# Patient Record
(demographics unavailable — no encounter records)

---

## 2025-05-06 NOTE — PLAN
[FreeTextEntry1] : 61-year-old  5 para 3 presents with a history of postmenopausal bleeding in April. Reviewed differential diagnosis of postmenopausal bleeding.  Plan: Transvaginal pelvic ultrasound.  Possible dilatation and curettage with diagnostic hysteroscopy pending the results of the pelvic ultrasound.  Information pamphlets were given describing the operative procedure.

## 2025-05-06 NOTE — REVIEW OF SYSTEMS
[Patient Intake Form Reviewed] : Patient intake form was reviewed [FreeTextEntry7] : Postmenopausal bleeding [FreeTextEntry8] : Postmenopausal bleeding

## 2025-05-06 NOTE — PHYSICAL EXAM
[Oriented x3] : oriented x3 [Labia Majora] : normal [Labia Minora] : normal [No Bleeding] : There was no active vaginal bleeding [Normal] : normal [Normal Position] : in a normal position [Uterine Adnexae] : normal [FreeTextEntry2] : Appropriately responsive, alert, and no acute distress. [FreeTextEntry7] : Soft. Nondistended. Nontender. No rebound or guarding. There are no palpable masses. No CVA tenderness. [Vulvar Atrophy] : vulvar atrophy [Atrophy] : atrophy [FreeTextEntry1] : External genitalia are within normal limits with no lesions visualized. [FreeTextEntry4] : No vaginal discharge, blood, or any lesions noted within the vaginal vault. [FreeTextEntry5] : A speculum was inserted without any difficulty. The cervix was normal in appearance. No cervical motion tenderness. [FreeTextEntry6] : Bimanual examination was notable for a normal, nontender uterus. There were no palpable adnexal masses or adnexal tenderness.
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
## 2025-05-20 NOTE — DATA REVIEWED
[MRI] : MRI [Bilateral] : of the bilateral [Hip] : hip [I independently reviewed and interpreted images and report] : I independently reviewed and interpreted images and report [FreeTextEntry1] : Full-thickness tearing of the anterior gluteus medius and gluteus minimus muscles
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
## 2025-05-20 NOTE — DISCUSSION/SUMMARY
[de-identified] :  JAGUAR CALLOWAY is doing very well s/p L hip open gluteus medius repair  Continue PT Continue DVT ppx TTWB with crutches x2 weeks, progress to 50% WB DC brace in 2 weeks Wound care discussed - no submerging incision underwater for 6 weeks from date of surgery F/u in 4 weeks for recheck or sooner if questions/concerns arise All questions answered, in agreement with this plan

## 2025-05-20 NOTE — IMAGING
[de-identified] : General: NAD, A&Ox3 Gait: Mildly antalgic, +Trendelenburg Foot Progression: neutral Knee Progression: neutral  L Hip Exam: Pain with Log Roll - negative Flexion: 100 Pain with Hyperflexion - yes FADIR - pos NIRMAL - neg Extension: 0 Flexion Contracture - none Ischial tenderness - none Trochanteric tenderness - pos   Abduction - 4 /5, pain - yes Adduction - 5 /5 Supine resisted SLR - 5 /5, pain - yes Dorsiflexion 5/5 Plantarflexion 5/5 EHL 5/5 DP/PT 2+, foot is warm and well perfused     Leg Lengths: symmetric

## 2025-07-17 NOTE — HISTORY OF PRESENT ILLNESS
[Patient reported colonoscopy was normal] : Patient reported colonoscopy was normal [LMP unknown] : LMP unknown [postmenopausal] : postmenopausal [Y] : Positive pregnancy history [Night Sweats] : night sweats [unknown] : Patient is unsure of the date of her LMP [Experiencing Menopausal Sxs] : experiencing menopausal symptoms [Menopause Age: ____] : age at menopause was [unfilled] [TextBox_4] : 61 -year old  5 para 3 postmenopausal presents today to follow up for postmenopausal bleeding. She is status post hysteroscopy with fractional dilation & curettage on July 3, 2025. Patient also reports experiencing worsening night sweats. [Mammogramdate] : 06/10/2024 [TextBox_19] : BIRADS-2 [BreastSonogramDate] : 06/10/2024 [TextBox_25] : BIRADS-2 [PapSmeardate] : 06/06/2024 [BoneDensityDate] : 06/10/2024 [TextBox_37] : Osteopenia [ColonoscopyDate] : 06/2021 [PGHxTotal] : 5 [[facility] HospitallTerm] : 3 [[facility] Campusiving] : 3 [PGHxABSpont] : 2

## 2025-07-17 NOTE — PHYSICAL EXAM
[MA] : MA [Oriented x3] : oriented x3 [Labia Majora] : normal [Labia Minora] : normal [Normal] : normal [Uterine Adnexae] : normal [FreeTextEntry2] :  Appropriately responsive, alert, and no acute distress.  [FreeTextEntry7] :  Soft. Nondistended. Nontender. No rebound or guarding. There are no palpable masses. Back: No CVA tenderness.  [Vulvar Atrophy] : vulvar atrophy [Atrophy] : atrophy [FreeTextEntry1] :  External genitalia are within normal limits with no lesions visualized.  [FreeTextEntry4] :  No vaginal discharge, blood or any lesions noted within the vaginal vault.  [FreeTextEntry5] :  A speculum was inserted without any difficulty. The cervix was normal in appearance. No cervical motion tenderness.  [FreeTextEntry6] : Bimanual examination was notable for a normal, nontender uterus. There were no palpable adnexal masses or adnexal tenderness.

## 2025-07-17 NOTE — PLAN
[FreeTextEntry1] : Discussion:  Findings on hysteroscopy revealed the absence of intracavitary lesions. Pathology from the D&C was consistent with benign inactive endometrium.  Differential diagnoses for postmenopausal bleeding were discussed.  The patient was advised that the etiology of the bleeding is most likely secondary to atrophy.  Nonetheless, the patient was advised that the false negative rate of the D&C procedure is between 10 to 15%.  Therefore, she was advised to follow-up in the office if she experiences any further episodes of bleeding.  Assessment plan:  61 -year old  5 para 3 postmenopausal status post fractional dilatation curettage and diagnostic hysteroscopy for postmenopausal bleeding. Pathology benign.  Worsening vasomotor symptoms. Information and a sample of Thermella was given.  Follow up in 1 month for annual.

## 2025-07-29 NOTE — IMAGING
[de-identified] : L Hip: Incision well healed, edges well approximated No erythema or drainage Active abduction to 30 No Trendelenburg gait No calf tenderness Motor and sensation intact all distributions Foot WWP, DP2+

## 2025-07-29 NOTE — DISCUSSION/SUMMARY
[de-identified] :  JAGUAR CALLOWAY is doing very well s/p L hip open gluteus medius repair  Improving with physical therapy.  Has most of her pain at the end of the day.  Counseled her that we will take some time to build the strength and endurance back up in her hip.  She should continue to try and make progress and walk longer periods without the cane but should use the cane if she has pain to avoid limping.  She also has some zaps and zingers going down the front and side of her leg and I gave her a new prescription for physical therapy to have them work on her back as well as her hip.  We discussed is not uncommon to have lumbar flareup after hip surgery.  I will see her back in 6 weeks for recheck.  All of her questions were answered she is in agreement with this plan.
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
